# Patient Record
Sex: MALE | URBAN - METROPOLITAN AREA
[De-identification: names, ages, dates, MRNs, and addresses within clinical notes are randomized per-mention and may not be internally consistent; named-entity substitution may affect disease eponyms.]

---

## 2022-03-22 ENCOUNTER — TELEPHONE (OUTPATIENT)
Dept: BEHAVIORAL HEALTH | Facility: CLINIC | Age: 52
End: 2022-03-22
Payer: COMMERCIAL

## 2022-03-24 ENCOUNTER — HOSPITAL ENCOUNTER (OUTPATIENT)
Dept: BEHAVIORAL HEALTH | Facility: CLINIC | Age: 52
Discharge: HOME | End: 2022-03-24
Attending: FAMILY MEDICINE | Admitting: FAMILY MEDICINE
Payer: COMMERCIAL

## 2022-03-24 PROCEDURE — 90791 PSYCH DIAGNOSTIC EVALUATION: CPT | Mod: GT,95 | Performed by: COUNSELOR

## 2022-03-24 RX ORDER — HYDROXYZINE HYDROCHLORIDE 25 MG/1
25 TABLET, FILM COATED ORAL
COMMUNITY
Start: 2022-01-03

## 2022-03-24 ASSESSMENT — ANXIETY QUESTIONNAIRES
2. NOT BEING ABLE TO STOP OR CONTROL WORRYING: NEARLY EVERY DAY
5. BEING SO RESTLESS THAT IT IS HARD TO SIT STILL: SEVERAL DAYS
1. FEELING NERVOUS, ANXIOUS, OR ON EDGE: NEARLY EVERY DAY
3. WORRYING TOO MUCH ABOUT DIFFERENT THINGS: NEARLY EVERY DAY
GAD7 TOTAL SCORE: 14
6. BECOMING EASILY ANNOYED OR IRRITABLE: NEARLY EVERY DAY
7. FEELING AFRAID AS IF SOMETHING AWFUL MIGHT HAPPEN: NOT AT ALL
4. TROUBLE RELAXING: SEVERAL DAYS

## 2022-03-24 ASSESSMENT — PATIENT HEALTH QUESTIONNAIRE - PHQ9: SUM OF ALL RESPONSES TO PHQ QUESTIONS 1-9: 9

## 2022-03-24 ASSESSMENT — COLUMBIA-SUICIDE SEVERITY RATING SCALE - C-SSRS
3. HAVE YOU BEEN THINKING ABOUT HOW YOU MIGHT KILL YOURSELF?: NO
2. HAVE YOU ACTUALLY HAD ANY THOUGHTS OF KILLING YOURSELF IN THE PAST MONTH?: NO
4. HAVE YOU HAD THESE THOUGHTS AND HAD SOME INTENTION OF ACTING ON THEM?: NO
1. IN THE PAST MONTH, HAVE YOU WISHED YOU WERE DEAD OR WISHED YOU COULD GO TO SLEEP AND NOT WAKE UP?: NO
5. HAVE YOU STARTED TO WORK OUT OR WORKED OUT THE DETAILS OF HOW TO KILL YOURSELF? DO YOU INTEND TO CARRY OUT THIS PLAN?: NO
6. HAVE YOU EVER DONE ANYTHING, STARTED TO DO ANYTHING, OR PREPARED TO DO ANYTHING TO END YOUR LIFE?: NO
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
## 2022-03-25 ASSESSMENT — ANXIETY QUESTIONNAIRES: GAD7 TOTAL SCORE: 14

## 2022-03-25 NOTE — PATIENT INSTRUCTIONS
Welcome to [facility] Adult Mental [facility] Outpatient Programs    Thank you for choosing [facility]!    Congratulations! You have completed the first step in your recovery by participating in a Diagnostic Assessment. With your input, STACIA Lyman, ANDREW, is recommending the following level of care and services to best meet your needs.    Managing mental health symptoms while balancing life stressors can be difficult. Our mental health programming will provide the group therapy, education, skills, and support needed to improve your well-being while living a healthy and manageable lifestyle.    All our Adult Mental Health Outpatient Programs are group-based and allow you to meet with peers who are trying to manage similar symptoms and/or life circumstances in a safe and therapeutic setting.    Recommendations and Plan:    Level of Care:  Dual Disorder Program (DDP-ADT) [phone]    Start Date:  March / 29 / 2022    Schedule:  Monday through Friday @ 9 AM to 11:50 AM    If you were placed on a Wait List following your Diagnostic Assessment, please expect the following:    You will receive a phone call from the program within a few days to discuss a start date and plan.      Please contact the program at the number listed above if you are choosing to be removed from the Wait List, need to reschedule your start or if you have any additional questions.    Type of Participation:  Virtual     We are currently providing 100% online group-based programming. It is a requirement that you be physically in the State of [place] when accessing services. Our providers must be licensed in the state you are located in.      To provide the best group experience for everyone, we expect confidentiality, regular attendance, and respectful participation by all.      1. Cameras need to be on during groups. We want to see you!   2. Be sure to be in a private place where others will not overhear or walk in. Using headphones  and making sure your screen is not visible to others are steps you can take to ensure confidentiality.   3. What is said in group, stays in group. (All personal or identifying information shared in group should be kept confidential and not shared with anyone).    4. Zoom may automatically show your first and last name unless you change it when logging into group. We encourage you to change your name to your first or preferred name. You may also include preferred pronouns.   5. Please be sitting upright in a comfortable position. This will maximize engagement and participation for everyone.   6. Please refrain from smoking, eating, driving, or engaging in other distracting activities during groups.  7. Facilitators may provide reminders of the above expectations during group as needed.    8. If your camera is off and you are not responding to prompts, facilitators will assume you have left and place you in the waiting room. You will need to request to return to group.    9. Please do NOT cancel your appointments through [employer].    If you are going to be absent, please contact the program number and leave a message so staff will not expect you.     You will NOT be billed for any sessions you do not attend.      See additional attendance and program participation information below.     Accessing Virtual Groups:    1. The best way to attend groups is through your [employer] account. Please ask staff if you need assistance setting up an account. [employer] HELPLINE: [phone] or [employer] - Login Page ([email or web address]).  2. You will also need to download Zoom Download for Windows - Zoom to your computer (preferred), phone or iPad/Tablet devise. It is NOT necessary to log into or set up a Zoom account.  3. Log into My Chart each day before group.   4. Go to the  Visits  tab and select the current date.    5. You will be asked to verify personal information on the first day or for longer programs, every 30 days. Please allow  extra time on your first day to complete this. You will also be asked to complete assessments regularly so we can monitor your progress and address concerns.    6. The daily invite through [employer] expires 15 minutes after group starts.     You will need to call the program number to request a link to the group if you:   - log out of group once it begins   - are late to group   - get disconnected   - are unable to access group for any reason      There is a new link created every day.    7. Breaks are provided between groups (10 minutes)     Do not log out.    You may mute or pause your video.     The group facilitator may put you in virtual waiting room until the next group starts.      Dual Diagnosis Adult Day Treatment Program Overview (DDP- ADT) [phone]    This level of care is less intensive than an inpatient or partial hospitalization program and provides more support than traditional individual psychotherapy. Mental health and substance use concerns will be addressed together for a successful treatment experience.     Length of Stay Typically, patients attend 6-8 weeks of programming, although this can vary depending on specific patient needs.   Treatment team Multi-disciplinary team includes a licensed psychotherapist, registered nurse, and occupational therapist.     Group-based programming - 3 groups per day; 5 days per week  - 50 minutes per group  - 10-minute break between each group  - Facilitated by a member of the treatment team    Group Psychotherapy is provided daily, allowing time to check-in, process concerns and share feedback/support. All other groups include a topic and provide opportunities for education, skill building, discussion, and support.      Example Group Topics Admission IOP topics are listed above and will align with additional group topics covered throughout the 12-week combined programming.     Topics may include:      Behavior Activation, Cognitive Restructuring: Cognitive  Distortions, Communication Skills/ Areas for Self-Improvement. Coping Skills, Discharge Planning, Dimensions of Wellness, Emotions, Forgiveness, Functional Nutrition, Grief, Life Transitions, Leisure Exploration, Life Skills, Medication Assessment, Mental Health Social Support, Mindfulness, Mood Tracking/Triggers, Motivation and Procrastination, Relationship, Relaxation Techniques, Self-Awareness, Self-Confidence, Self-Care, Self-Compassion, Shame and Guilt, Sleep hygiene, SMART Goals, Stages to [place] with Stress, Stigma, Strategies to Improve Motivation, Symptom Awareness, Time Management, Trauma Triggers, Values, Wellness     Psychiatrist - Available for Treatment Team consultation. Patients are encouraged to continue with their outpatient/community providers.     Our Nursing team will partner with you to triage medication questions/concerns.    - Patients who need bridging services between providers may request a visit as needed and we will coordinate when available.      Individual Therapy Not provided in this program.   Physical Health Screen Patients meet with a program nurse within the first week to complete a brief physical health screen. This is a program requirement.   FMLA or Short-term Disability - We encourage you to request completion of paperwork from your long-term providers.   - If this is not an option, please notify the program nurse as soon as possible.  - We will do our best to help you coordinate completion of paperwork.   - Medical Record requests: please contact Release of Information  at [phone] and allow up to 14 days for records once the authorization for release is received.    Treatment and Discharge Planning Patients meet individually with team members for treatment planning.   - We will help you develop goals and identify strengths and/or barriers.   - We will discuss your program participation, progress, and discharge planning.   - We are available to assist with referrals and  service coordination; please let us know how best we can support your specific needs.   - You will receive copies of your treatment plan and discharge summary via [employer].        An Important Note from your Program Treatment Team...    Welcome! We are happy to be partnering with you on your recovery journey. Our experienced clinicians have developed programming based on current research and evidence-based practice to provide you with high quality mental health treatment.     Attendance and Program Participation:    You will participate in a variety of groups each day. Our goal is to provide you with a rich and varied therapeutic healing environment knowing patients have unique experiences and preferred ways of sharing, learning, processing, and engaging in the recovery process.    You are expected to attend all groups on time.    If you are going to be late or absent, please let a team member know the reason. You can also leave that same information at the number listed above.  o In the event of an unreported absence, we will reach out to you. If we are unable to reach you, know that we may call your emergency contact.  o We always attempt to establish contact with your emergency contact prior to initiating a wellness check.    While it is important that you continue to attend appointments with your individual therapist, psychiatrist, and other medical providers, you are encouraged to schedule these appointments outside of group hours whenever possible.    If your attendance becomes a concern, your treatment team will have a discussion with you and may start an Attendance Agreement. Following your Attendance Agreement is required to prevent early discharge from the program.    To get the most out of the program and to support your wellbeing we require that you do not use any chemicals, tobacco or vape products on screen or during program hours. The team is available to assist you if this is something you struggle  with.    Please be mindful that you are part of a group; therefore, we ask that you be respectful of other group members' needs and do not use derogatory, offensive, or discriminatory language.  o You will be removed from group if suspected of being under the influence of substances or if using language that negatively impacts the group.  o Your treatment team will address any concerns with you and offer recommendations. A Behavior Agreement may be started. Following your Behavior Agreement is required to prevent early discharge from the program.    Communication with other group members outside of group is discouraged. This can affect your treatment and the way the group functions.  o If you choose to share contact information with group peers AFTER you are discharged from the program, this decision is completely independent of any program coordination or support.  o While in the program, participants may not engage in any sexual or intimate relationships with each other outside of group. This may result in immediate discharge of both participants from the program.   Trauma:    Many of our patients have experienced trauma. You are not alone. This can be challenging for patients to manage. All our team members have been trained in Trauma Informed Care and will provide you with the education, skills training, and support that you need to stabilize your symptoms.  o Specific details and descriptions of abuse, assault, violence, neglect, etc., are best processed in individual therapy as to avoid triggering other group members.  o Discussing how traumatic experiences have impacted beliefs about self/others/world and practicing skills to cope with symptoms is very appropriate for group therapy.     We look forward to working together to support your mental health.     We love feedback from our patients about our program!  Please take a few moments to respond to surveys sent out by [employer].  This will help us  continue to make improvements and to keep the things that are   important to you!

## 2022-03-28 ENCOUNTER — TELEPHONE (OUTPATIENT)
Dept: BEHAVIORAL HEALTH | Facility: CLINIC | Age: 52
End: 2022-03-28

## 2022-03-29 ENCOUNTER — TELEPHONE (OUTPATIENT)
Dept: BEHAVIORAL HEALTH | Facility: CLINIC | Age: 52
End: 2022-03-29

## 2022-03-29 NOTE — TELEPHONE ENCOUNTER
Patient contacted writer inquiring about DDP 1 program. Writer explained that due to lack of attendance yesterday group is now on pause until more patients are enrolled. Patient became irritable and offered no explanation as to why he did not attend or call on his scheduled start date yesterday. Writer provided him with contact information for , Katy Pemberton, to assist him in finding alternative programming. Writer offered to contact patient when DDP 1 is open.     Catalina Zhang, [facility], [facility]  3/29/2022

## 2022-04-03 ENCOUNTER — HEALTH MAINTENANCE LETTER (OUTPATIENT)
Age: 52
End: 2022-04-03

## 2022-10-03 ENCOUNTER — HEALTH MAINTENANCE LETTER (OUTPATIENT)
Age: 52
End: 2022-10-03

## 2023-05-21 ENCOUNTER — HEALTH MAINTENANCE LETTER (OUTPATIENT)
Age: 53
End: 2023-05-21

## 2024-07-22 ENCOUNTER — TELEPHONE (OUTPATIENT)
Dept: BEHAVIORAL HEALTH | Facility: CLINIC | Age: 54
End: 2024-07-22

## 2024-07-22 NOTE — TELEPHONE ENCOUNTER
"7/22/24: Pt is a(n) adult (18+ out of HS) Seeking as eval for Adult BARBIE Assessment for primary substance use treatment (OP BARBIE programs including Co-occurring).  Appointment scheduled by:  Patient.  (self-pay - complete Cost Estimate)  Caller name:  Bharat Duran    Caller phone #: [phone]  Legal Guardianship Reviewed?  No  Honoring Choices Notified?  No  Brief reason for appt:  Pt requesting eval     needed?  NO    Contact information verified/updated: Yes    If appt is for adult BARBIE program location, confirm you have verified the location and address with the patient referring to the template header.  Yes    Nga Sarmiento    \"We have scheduled your evaluation. In the event that your insurance coverage comes back as out of network, you may receive a call to cancel your appointment and direct you to your insurance company for in-network coverage.\"    Disclaimer regarding insurance read to patient?  Yes  Informed patient [place] are for programming that is in person in the [place] area?  Yes - proceed with scheduling   "

## 2024-07-23 ENCOUNTER — HOSPITAL ENCOUNTER (OUTPATIENT)
Dept: BEHAVIORAL HEALTH | Facility: CLINIC | Age: 54
Discharge: HOME | End: 2024-07-23
Attending: PSYCHIATRY & NEUROLOGY
Payer: COMMERCIAL

## 2024-07-23 VITALS — BODY MASS INDEX: 31.05 KG/M2 | HEIGHT: 76 IN | WEIGHT: 255 LBS

## 2024-07-23 DIAGNOSIS — F12.20 CANNABIS USE DISORDER, SEVERE, DEPENDENCE (H): ICD-10-CM

## 2024-07-23 DIAGNOSIS — F14.20 COCAINE USE DISORDER, SEVERE, DEPENDENCE (H): Primary | ICD-10-CM

## 2024-07-23 DIAGNOSIS — F17.200 TOBACCO USE DISORDER, MODERATE, DEPENDENCE: ICD-10-CM

## 2024-07-23 DIAGNOSIS — F10.20 ALCOHOL USE DISORDER, SEVERE, DEPENDENCE (H): ICD-10-CM

## 2024-07-23 PROCEDURE — H0001 ALCOHOL AND/OR DRUG ASSESS: HCPCS

## 2024-07-23 ASSESSMENT — PATIENT HEALTH QUESTIONNAIRE - PHQ9: SUM OF ALL RESPONSES TO PHQ QUESTIONS 1-9: 9

## 2024-07-23 ASSESSMENT — COLUMBIA-SUICIDE SEVERITY RATING SCALE - C-SSRS
TOTAL  NUMBER OF INTERRUPTED ATTEMPTS LIFETIME: NO
6. HAVE YOU EVER DONE ANYTHING, STARTED TO DO ANYTHING, OR PREPARED TO DO ANYTHING TO END YOUR LIFE?: NO
1. HAVE YOU WISHED YOU WERE DEAD OR WISHED YOU COULD GO TO SLEEP AND NOT WAKE UP?: NO
ATTEMPT LIFETIME: NO
2. HAVE YOU ACTUALLY HAD ANY THOUGHTS OF KILLING YOURSELF?: NO
TOTAL  NUMBER OF ABORTED OR SELF INTERRUPTED ATTEMPTS LIFETIME: NO

## 2024-07-23 ASSESSMENT — ANXIETY QUESTIONNAIRES
4. TROUBLE RELAXING: MORE THAN HALF THE DAYS
3. WORRYING TOO MUCH ABOUT DIFFERENT THINGS: MORE THAN HALF THE DAYS
1. FEELING NERVOUS, ANXIOUS, OR ON EDGE: SEVERAL DAYS
7. FEELING AFRAID AS IF SOMETHING AWFUL MIGHT HAPPEN: SEVERAL DAYS
GAD7 TOTAL SCORE: 11
2. NOT BEING ABLE TO STOP OR CONTROL WORRYING: MORE THAN HALF THE DAYS
6. BECOMING EASILY ANNOYED OR IRRITABLE: MORE THAN HALF THE DAYS
GAD7 TOTAL SCORE: 11
5. BEING SO RESTLESS THAT IT IS HARD TO SIT STILL: SEVERAL DAYS

## 2024-07-23 ASSESSMENT — PAIN SCALES - GENERAL: PAINLEVEL: MODERATE PAIN (4)
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
## 2024-07-28 ENCOUNTER — HEALTH MAINTENANCE LETTER (OUTPATIENT)
Age: 54
End: 2024-07-28

## 2025-06-27 ENCOUNTER — APPOINTMENT (OUTPATIENT)
Dept: CT IMAGING | Facility: CLINIC | Age: 55
End: 2025-06-27
Attending: EMERGENCY MEDICINE
Payer: COMMERCIAL

## 2025-06-27 PROCEDURE — 70450 CT HEAD/BRAIN W/O DYE: CPT

## 2025-06-27 PROCEDURE — 70498 CT ANGIOGRAPHY NECK: CPT
